# Patient Record
Sex: MALE | Race: BLACK OR AFRICAN AMERICAN | NOT HISPANIC OR LATINO | URBAN - METROPOLITAN AREA
[De-identification: names, ages, dates, MRNs, and addresses within clinical notes are randomized per-mention and may not be internally consistent; named-entity substitution may affect disease eponyms.]

---

## 2021-12-09 VITALS
TEMPERATURE: 98 F | WEIGHT: 195.11 LBS | DIASTOLIC BLOOD PRESSURE: 93 MMHG | HEIGHT: 65 IN | RESPIRATION RATE: 14 BRPM | HEART RATE: 61 BPM | SYSTOLIC BLOOD PRESSURE: 157 MMHG | OXYGEN SATURATION: 99 %

## 2021-12-09 RX ORDER — CHLORHEXIDINE GLUCONATE 213 G/1000ML
1 SOLUTION TOPICAL ONCE
Refills: 0 | Status: DISCONTINUED | OUTPATIENT
Start: 2021-12-13 | End: 2021-12-28

## 2021-12-09 NOTE — H&P ADULT - ASSESSMENT
65 y/o Male w/ PMHx of HTN, HLD, and prostate CA (Surgery pending cardiac clearance) Asthma  initially presents for cardiac catheterization secondary to CCS III anginal equivalent symptoms in the setting of an abnormal stress echo.    ASA III Mallampati III  Precath consented  Given NS @ 250cc bolus, and started IVF NS @ 75cc/h  Given ASA 325mg POx1, and will load as necessary with Plavix in cath lab    Patient is a suitable candidate for moderate sedation.     Risks & benefits of procedure and alternative therapy have been explained to the patient including but not limited to: allergic reaction, bleeding w/possible need for blood transfusion, infection, renal and vascular compromise, limb damage, arrhythmia, stroke, vessel dissection/perforation, Myocardial infarction, emergent CABG. Informed consent obtained and in chart.  65 y/o Male w/ PMHx of HTN, HLD, and prostate CA (Surgery pending cardiac clearance) Asthma  initially presents for cardiac catheterization secondary to CCS III anginal equivalent symptoms in the setting of an abnormal stress echo.    ASA III Mallampati III  Precath consented  Given NS @ 250cc bolus, and started IVF NS @ 75cc/h  Took ASA 81mg POx1, and will load as necessary with Plavix in cath lab as patient is pending cardiac clearance for prostate surgery     Patient is a suitable candidate for moderate sedation.     Risks & benefits of procedure and alternative therapy have been explained to the patient including but not limited to: allergic reaction, bleeding w/possible need for blood transfusion, infection, renal and vascular compromise, limb damage, arrhythmia, stroke, vessel dissection/perforation, Myocardial infarction, emergent CABG. Informed consent obtained and in chart.

## 2021-12-09 NOTE — H&P ADULT - HISTORY OF PRESENT ILLNESS
COVID:  Pharmacy:  Escort:  Cardiologist: Dr. Hunter    67 y/o Male w/ PMHx of HTN, HLD, and prostate CA __________ initially presented to Cardiologist Dr. Hunter for pre-op clearance for prostate surgery. Pt endorses YOU after mild exertion (1-2 flights of stairs) for the past 2 months. Per MD note, office EKG shows NSR w/ RBBB and ST changes in anterolateral leads. Pt denies _________.Echo 12/2021: LV 55-60%, G1DD, mild cLVH, nodular calcification of AV, trace AVR. Stress echo (per MD note): Hypokinesis in mid-basal inferior and inferoseptal regions at peak stress c/w inducible ischemia.  In light of patient's risk factors, abnormal stress echo results, and CCS class 3 anginal/anginal-equivalent symptoms, patient is referred to [facility] for cardiac catheterization w/ possible intervention if clinically indicated. COVID:   Pharmacy:   Escort:   Cardiologist: Dr. Hunter    WILL CALL BACK   67 y/o Male w/ PMHx of HTN, HLD, and prostate CA __________ initially presented to Cardiologist Dr. Hunter for pre-op clearance for prostate surgery. Pt endorses YOU after mild exertion (1-2 flights of stairs) for the past 2 months. Per MD note, office EKG shows NSR w/ RBBB and ST changes in anterolateral leads. Pt denies _________.Echo 12/2021: LV 55-60%, G1DD, mild cLVH, nodular calcification of AV, trace AVR. Stress echo (per MD note): Hypokinesis in mid-basal inferior and inferoseptal regions at peak stress c/w inducible ischemia.  In light of patient's risk factors, abnormal stress echo results, and CCS class 3 anginal/anginal-equivalent symptoms, patient is referred to [facility] for cardiac catheterization w/ possible intervention if clinically indicated. COVID: outside facility PCR -patient to bring   Pharmacy: [facility] in [place]  Escort: Son  Cardiologist: Dr. Hunter    Patient will bring Meds     67 y/o Male w/ PMHx of HTN, HLD, and prostate CA (Surgery pending cardiac clearance) Asthma  initially presented to Cardiologist Dr. Hunter for pre-op clearance for prostate surgery. Pt endorses YOU after mild exertion (1-2 flights of stairs) for the past 2 months. Patient denies active chest pain, palpitations,diaphoresis, fatigue, dizziness, syncope, lower extremity edema, orthopnea, positional nocturnal dyspnea.  Per MD note, office EKG shows NSR w/ RBBB and ST changes in anterolateral leads. Echo 12/2021: LV 55-60%, G1DD, mild cLVH, nodular calcification of AV, trace AVR. Stress echo (per MD note): Hypokinesis in mid-basal inferior and inferoseptal regions at peak stress c/w inducible ischemia. In light of patient's risk factors, abnormal stress echo results, and CCS class 3 anginal/anginal-equivalent symptoms, patient is referred to [facility] for cardiac catheterization w/ possible intervention if clinically indicated. COVID 12/13 at [facility]   Pharmacy: [facility] [place]  Escort: Son  Cardiologist: Dr. Hunter    65 y/o Male w/ PMHx of HTN, HLD, and prostate CA (Surgery pending cardiac clearance) Asthma  initially presented to Cardiologist Dr. Hunter for pre-op clearance for prostate surgery. Pt endorses YOU after mild exertion (1-2 flights of stairs) for the past 2 months. Patient denies active chest pain, palpitations,diaphoresis, fatigue, dizziness, syncope, lower extremity edema, orthopnea, positional nocturnal dyspnea.  Per MD note, office EKG shows NSR w/ RBBB and ST changes in anterolateral leads. Echo 12/2021: LV 55-60%, G1DD, mild cLVH, nodular calcification of AV, trace AVR. Stress echo (per MD note): Hypokinesis in mid-basal inferior and inferoseptal regions at peak stress c/w inducible ischemia. In light of patient's risk factors, abnormal stress echo results, and CCS class 3 anginal/anginal-equivalent symptoms, patient is referred to [facility] for cardiac catheterization w/ possible intervention if clinically indicated.

## 2021-12-09 NOTE — H&P ADULT - GASTROINTESTINAL DETAILS
Patient Information     Patient Name MRN Sex Ronal Headley [id number] Male 1969      Telephone Encounter by Sallie Greenfield at 2017  3:53 PM     Author:  Sallie Greenfield Service:  (none) Author Type:  (none)     Filed:  2017  3:54 PM Encounter Date:  2017 Status:  Signed     :  Sallie Greenfield            Note given to  to set up appointment.  Sallie Greenfield LPN .......2017 3:54 PM          normal/soft/nontender/no distention

## 2021-12-13 ENCOUNTER — OUTPATIENT (OUTPATIENT)
Dept: OUTPATIENT SERVICES | Facility: HOSPITAL | Age: 66
LOS: 1 days | Discharge: HOME | End: 2021-12-13
Payer: COMMERCIAL

## 2021-12-13 LAB
A1C WITH ESTIMATED AVERAGE GLUCOSE RESULT: 5.6 % — SIGNIFICANT CHANGE UP (ref 4–5.6)
ANION GAP SERPL CALC-SCNC: 11 MMOL/L — SIGNIFICANT CHANGE UP (ref 5–17)
APTT BLD: 32.1 SEC — SIGNIFICANT CHANGE UP (ref 27.5–35.5)
BASOPHILS # BLD AUTO: 0.07 K/UL — SIGNIFICANT CHANGE UP (ref 0–0.2)
BASOPHILS NFR BLD AUTO: 1.1 % — SIGNIFICANT CHANGE UP (ref 0–2)
BUN SERPL-MCNC: 19 MG/DL — SIGNIFICANT CHANGE UP (ref 7–23)
CALCIUM SERPL-MCNC: 9.5 MG/DL — SIGNIFICANT CHANGE UP (ref 8.4–10.5)
CHLORIDE SERPL-SCNC: 102 MMOL/L — SIGNIFICANT CHANGE UP (ref 96–108)
CHOLEST SERPL-MCNC: 151 MG/DL — SIGNIFICANT CHANGE UP
CO2 SERPL-SCNC: 27 MMOL/L — SIGNIFICANT CHANGE UP (ref 22–31)
CREAT SERPL-MCNC: 1.01 MG/DL — SIGNIFICANT CHANGE UP (ref 0.5–1.3)
EOSINOPHIL # BLD AUTO: 0.26 K/UL — SIGNIFICANT CHANGE UP (ref 0–0.5)
EOSINOPHIL NFR BLD AUTO: 4 % — SIGNIFICANT CHANGE UP (ref 0–6)
ESTIMATED AVERAGE GLUCOSE: 114 MG/DL — SIGNIFICANT CHANGE UP (ref 68–114)
GLUCOSE SERPL-MCNC: 96 MG/DL — SIGNIFICANT CHANGE UP (ref 70–99)
HCT VFR BLD CALC: 47.2 % — SIGNIFICANT CHANGE UP (ref 39–50)
HDLC SERPL-MCNC: 40 MG/DL — LOW
HGB BLD-MCNC: 14.9 G/DL — SIGNIFICANT CHANGE UP (ref 13–17)
IMM GRANULOCYTES NFR BLD AUTO: 0.2 % — SIGNIFICANT CHANGE UP (ref 0–1.5)
INR BLD: 1.01 — SIGNIFICANT CHANGE UP (ref 0.88–1.16)
LIPID PNL WITH DIRECT LDL SERPL: 84 MG/DL — SIGNIFICANT CHANGE UP
LYMPHOCYTES # BLD AUTO: 2.75 K/UL — SIGNIFICANT CHANGE UP (ref 1–3.3)
LYMPHOCYTES # BLD AUTO: 42.4 % — SIGNIFICANT CHANGE UP (ref 13–44)
MCHC RBC-ENTMCNC: 25.7 PG — LOW (ref 27–34)
MCHC RBC-ENTMCNC: 31.6 GM/DL — LOW (ref 32–36)
MCV RBC AUTO: 81.5 FL — SIGNIFICANT CHANGE UP (ref 80–100)
MONOCYTES # BLD AUTO: 0.87 K/UL — SIGNIFICANT CHANGE UP (ref 0–0.9)
MONOCYTES NFR BLD AUTO: 13.4 % — SIGNIFICANT CHANGE UP (ref 2–14)
NEUTROPHILS # BLD AUTO: 2.53 K/UL — SIGNIFICANT CHANGE UP (ref 1.8–7.4)
NEUTROPHILS NFR BLD AUTO: 38.9 % — LOW (ref 43–77)
NON HDL CHOLESTEROL: 111 MG/DL — SIGNIFICANT CHANGE UP
NRBC # BLD: 0 /100 WBCS — SIGNIFICANT CHANGE UP (ref 0–0)
PLATELET # BLD AUTO: 248 K/UL — SIGNIFICANT CHANGE UP (ref 150–400)
POTASSIUM SERPL-MCNC: 3.8 MMOL/L — SIGNIFICANT CHANGE UP (ref 3.5–5.3)
POTASSIUM SERPL-SCNC: 3.8 MMOL/L — SIGNIFICANT CHANGE UP (ref 3.5–5.3)
PROTHROM AB SERPL-ACNC: 12.1 SEC — SIGNIFICANT CHANGE UP (ref 10.6–13.6)
RBC # BLD: 5.79 M/UL — SIGNIFICANT CHANGE UP (ref 4.2–5.8)
RBC # FLD: 14.3 % — SIGNIFICANT CHANGE UP (ref 10.3–14.5)
SODIUM SERPL-SCNC: 140 MMOL/L — SIGNIFICANT CHANGE UP (ref 135–145)
TRIGL SERPL-MCNC: 137 MG/DL — SIGNIFICANT CHANGE UP
WBC # BLD: 6.49 K/UL — SIGNIFICANT CHANGE UP (ref 3.8–10.5)
WBC # FLD AUTO: 6.49 K/UL — SIGNIFICANT CHANGE UP (ref 3.8–10.5)

## 2021-12-13 PROCEDURE — C1887: CPT

## 2021-12-13 PROCEDURE — 85730 THROMBOPLASTIN TIME PARTIAL: CPT

## 2021-12-13 PROCEDURE — 93010 ELECTROCARDIOGRAM REPORT: CPT

## 2021-12-13 PROCEDURE — 93458 L HRT ARTERY/VENTRICLE ANGIO: CPT

## 2021-12-13 PROCEDURE — 83036 HEMOGLOBIN GLYCOSYLATED A1C: CPT

## 2021-12-13 PROCEDURE — C1769: CPT

## 2021-12-13 PROCEDURE — 80061 LIPID PANEL: CPT

## 2021-12-13 PROCEDURE — 85610 PROTHROMBIN TIME: CPT

## 2021-12-13 PROCEDURE — 36415 COLL VENOUS BLD VENIPUNCTURE: CPT

## 2021-12-13 PROCEDURE — 80048 BASIC METABOLIC PNL TOTAL CA: CPT

## 2021-12-13 PROCEDURE — 93005 ELECTROCARDIOGRAM TRACING: CPT

## 2021-12-13 PROCEDURE — 85025 COMPLETE CBC W/AUTO DIFF WBC: CPT

## 2021-12-13 PROCEDURE — 99152 MOD SED SAME PHYS/QHP 5/>YRS: CPT

## 2021-12-13 PROCEDURE — C1894: CPT

## 2021-12-13 RX ORDER — METOPROLOL TARTRATE 50 MG
1 TABLET ORAL
Qty: 0 | Refills: 0 | DISCHARGE

## 2021-12-13 RX ORDER — SODIUM CHLORIDE 9 MG/ML
250 INJECTION INTRAMUSCULAR; INTRAVENOUS; SUBCUTANEOUS ONCE
Refills: 0 | Status: COMPLETED | OUTPATIENT
Start: 2021-12-13 | End: 2021-12-13

## 2021-12-13 RX ORDER — ASPIRIN/CALCIUM CARB/MAGNESIUM 324 MG
1 TABLET ORAL
Qty: 0 | Refills: 0 | DISCHARGE

## 2021-12-13 RX ORDER — MONTELUKAST 4 MG/1
1 TABLET, CHEWABLE ORAL
Qty: 0 | Refills: 0 | DISCHARGE

## 2021-12-13 RX ORDER — AMLODIPINE BESYLATE 2.5 MG/1
1 TABLET ORAL
Qty: 0 | Refills: 0 | DISCHARGE

## 2021-12-13 RX ORDER — SODIUM CHLORIDE 9 MG/ML
500 INJECTION INTRAMUSCULAR; INTRAVENOUS; SUBCUTANEOUS
Refills: 0 | Status: DISCONTINUED | OUTPATIENT
Start: 2021-12-13 | End: 2021-12-28

## 2021-12-13 RX ORDER — ATORVASTATIN CALCIUM 80 MG/1
1 TABLET, FILM COATED ORAL
Qty: 0 | Refills: 0 | DISCHARGE

## 2021-12-13 RX ORDER — ALBUTEROL 90 UG/1
2 AEROSOL, METERED ORAL
Qty: 0 | Refills: 0 | DISCHARGE

## 2021-12-13 RX ADMIN — SODIUM CHLORIDE 100 MILLILITER(S): 9 INJECTION INTRAMUSCULAR; INTRAVENOUS; SUBCUTANEOUS at 17:10

## 2021-12-13 RX ADMIN — SODIUM CHLORIDE 500 MILLILITER(S): 9 INJECTION INTRAMUSCULAR; INTRAVENOUS; SUBCUTANEOUS at 17:10

## 2021-12-13 NOTE — PROGRESS NOTE ADULT - SUBJECTIVE AND OBJECTIVE BOX
Interventional Cardiology PA SDA Discharge Note    Patient without complaints. Ambulated and voided without difficulties    Afebrile, VSS    Ext:    	  		        Right           Radial :   no hematoma, no    bleeding, dressing; C/D/I      Pulses:    intact RAD/DP/PT to baseline     A/P:      67 y/o Male w/ PMHx of HTN, HLD, and prostate CA (Surgery pending cardiac clearance) Asthma  initially presented to Cardiologist Dr. Hunter for pre-op clearance for prostate surgery. Pt endorses YOU after mild exertion (1-2 flights of stairs) for the past 2 months. Patient denies active chest pain, palpitations,diaphoresis, fatigue, dizziness, syncope, lower extremity edema, orthopnea, positional nocturnal dyspnea.  Per MD note, office EKG shows NSR w/ RBBB and ST changes in anterolateral leads. Echo 12/2021: LV 55-60%, G1DD, mild cLVH, nodular calcification of AV, trace AVR. Stress echo (per MD note): Hypokinesis in mid-basal inferior and inferoseptal regions at peak stress c/w inducible ischemia. In light of patient's risk factors, abnormal stress echo results, and CCS class 3 anginal/anginal-equivalent symptoms, patient is referred to [facility] for cardiac catheterization w/ possible intervention if clinically indicated.    s/p cardiac cath on 12/13/21 revealing nonobstructive coronary disease.     1.	Stable for discharge as per attending Dr. Hunter after bed rest, pt voids, groin/wrist stable and 30 minutes of ambulation.  2.	Follow-up with PMD/Cardiologist Dr. Ivy in 1-2 weeks  3.	Discharged forms signed and copies in chart

## 2021-12-22 DIAGNOSIS — I25.118 ATHEROSCLEROTIC HEART DISEASE OF NATIVE CORONARY ARTERY WITH OTHER FORMS OF ANGINA PECTORIS: ICD-10-CM

## 2021-12-22 DIAGNOSIS — R94.39 ABNORMAL RESULT OF OTHER CARDIOVASCULAR FUNCTION STUDY: ICD-10-CM
